# Patient Record
Sex: MALE | Race: WHITE
[De-identification: names, ages, dates, MRNs, and addresses within clinical notes are randomized per-mention and may not be internally consistent; named-entity substitution may affect disease eponyms.]

---

## 2018-01-24 ENCOUNTER — HOSPITAL ENCOUNTER (EMERGENCY)
Dept: HOSPITAL 68 - ERH | Age: 63
Discharge: ANOTHER HOSPITAL | End: 2018-01-24
Payer: COMMERCIAL

## 2018-01-24 VITALS — WEIGHT: 250 LBS | HEIGHT: 73 IN | BODY MASS INDEX: 33.13 KG/M2

## 2018-01-24 VITALS — DIASTOLIC BLOOD PRESSURE: 84 MMHG | SYSTOLIC BLOOD PRESSURE: 145 MMHG

## 2018-01-24 DIAGNOSIS — R22.1: Primary | ICD-10-CM

## 2018-01-24 LAB
ABSOLUTE GRANULOCYTE CT: 7.8 /CUMM (ref 1.4–6.5)
BASOPHILS # BLD: 0 /CUMM (ref 0–0.2)
BASOPHILS NFR BLD: 0.2 % (ref 0–2)
EOSINOPHIL # BLD: 0.1 /CUMM (ref 0–0.7)
EOSINOPHIL NFR BLD: 0.6 % (ref 0–5)
ERYTHROCYTE [DISTWIDTH] IN BLOOD BY AUTOMATED COUNT: 12.8 % (ref 11.5–14.5)
GRANULOCYTES NFR BLD: 77.6 % (ref 42.2–75.2)
HCT VFR BLD CALC: 44.8 % (ref 42–52)
LYMPHOCYTES # BLD: 1.4 /CUMM (ref 1.2–3.4)
MCH RBC QN AUTO: 31.6 PG (ref 27–31)
MCHC RBC AUTO-ENTMCNC: 34.8 G/DL (ref 33–37)
MCV RBC AUTO: 90.8 FL (ref 80–94)
MONOCYTES # BLD: 0.7 /CUMM (ref 0.1–0.6)
PLATELET # BLD: 177 /CUMM (ref 130–400)
PMV BLD AUTO: 8.1 FL (ref 7.4–10.4)
RED BLOOD CELL CT: 4.93 /CUMM (ref 4.7–6.1)
WBC # BLD AUTO: 10.1 /CUMM (ref 4.8–10.8)

## 2018-01-24 NOTE — ED GENERAL ADULT
History of Present Illness
 
General
Chief Complaint: General Adult
Stated Complaint: R SIDE FACIAL SWELLING
Source: patient
Exam Limitations: no limitations
 
Vital Signs & Intake/Output
Vital Signs & Intake/Output
 Vital Signs
 
 
Date Time Temp Pulse Resp B/P B/P Pulse O2 O2 Flow FiO2
 
     Mean Ox Delivery Rate 
 
 1442 97.5 78 18 145/84  99 Room Air  
 
 1215    146/76     
 
 1158 97.0 78 16   98 Room Air  
 
 
 
Allergies
Coded Allergies:
No Known Allergies (18)
 
Triage Note:
62M HAD DENTAL PROCEDURE YESTERDAY WITH CAVITY
 FILLING, PT BLEW HIS NOSE AND FELT THE ENTIRE
 CHEEK FILL UP. CALLED DENTIST WHO REFERRED HIM TO
 ED FOR EVALUATION. PAIN 9/10 HAS NOT TAKEN
 ANYTHING FOR PAIN. AFEBRILE
 INVOLVEMENT, MANAGES OWN SECRETIONS
Triage Nurses Notes Reviewed? yes
Onset: Abrupt
Duration: day(s): (1)
Timing: no prior history
Injury Environment: home
Severity: moderate
Severity Numbers: 7
No Modifying Factors: none
HPI:
Patient is a 62-year-old male with history of melanoma, has been in remission 
for the past 20 years presenting to the emergency department with chief 
complaint of right-sided neck and cheek swelling that started suddenly yesterday
after he had a cavity filled.  Patient was that he went in to get a cavity 
filled was doing great, reports that the workup was extensive and took a little 
bit longer than it usually does but was feeling fine.  Went home and blew his 
nose and his right cheek blew up.  Patient (symptoms are worse today than they 
were yesterday.  He saw his dentist and he advised the patient to complete
(Juany Fernandez)
 
Past History
 
Travel History
Traveled to [place] past 21 day No
 
Medical History
Any Pertinent Medical History? see below for history
Cancer(s): melanoma
 
Surgical History
Surgical History: non-contributory
 
Psychosocial History
What is your primary language English
Tobacco Use: Never used
 
Family History
Hx Contributory? No
(Juany Fernandez)
 
Review of Systems
 
Review of Systems
Constitutional:
Reports: no symptoms. 
Comments
Review of systems: See HPI, All other systems negative.
Constitutional, no chills fever or weight loss
HEENT: No visual changes no sore throat no congestion
Cardiovascular: No chest pain ,palpitation , orthopnea or ankle swelling
Skin, no jaundice no rashes
Respiratory: No dyspnea cough sputum or hemoptysis
GI: No nausea no vomiting
: No dysuria No hematuria
Muscle skeletal: no back pain, no neck pain,
Neurologic: No numbness no confusion
Psych: No stress anxiety or depression,.
Heme/endocrine: No bruising no bleeding no polyuria or polydipsia
Immunology: No splenectomy or history of AIDS
(Juany Fernandez)
 
Physical Exam
 
Physical Exam
General Appearance: well developed/nourished, no apparent distress, alert, awake
, comfortable
Comments:
Well-developed well-nourished person in no acute distress
HEENT:  extraocular motion intact, no nystagmus. Pupils equally round and 
reactive to light and accommodation. Nose is atraumatic. External auditory canal
and Tympanic membranes clear. Pharynx normal. No swelling or edema.  Moderate 
edema noted of the right maxillary sinus that extends into the preauricular 
area.  No hemotympanum.  No crepitus palpated over this area although patient 
does report that he can hear "[employer]" with palpation.  Mild erythema 
noted around the gumline of the right posterior molars, inferior aspect.
Neck: Moderate edema appreciated of the right lateral neck extending and to the 
preauricular area, edema also extends into the maxillary space of the right 
maxillary sinus.  Mildly tender to palpation.
Cardiovascular: Regular rate and rhythms no murmurs rubs or gallops, normal JVP
Respiratory: Chest nontender. No respiratory distress.breath sounds clear to 
auscultation bilaterally
Extremity: No edema
Neuro: Alert oriented x3, motor sensory normal, cranial nerves II through XII 
grossly intact.
Skin: No appreciable rash on exposed skin, skin is warm and dry.
Psych: Mood and affect is normal, memory and judgment is normal.
 
Core Measures
ACS in differential dx? No
CVA/TIA Diagnosis: No
Sepsis Present: No
Sepsis Focused Exam Completed? No
(Juany Fernandez)
 
Progress
Differential Diagnoses
I considered the following diagnoses in my evaluation of the patient:  Abscess, 
periodontal abscess, necrotizing fasciitis, blown sinus, sinusitis
 
Plan of Care:
 Orders
 
 
Procedure Date/time Status
 
BLOOD CULTURE  1408 Active
 
COMPREHENSIVE METABOLIC PANEL  1249 Complete
 
CBC WITHOUT DIFFERENTIAL  1249 Complete
 
 
 Laboratory Tests
 
 
 
18 1300:
Anion Gap 13, Estimated GFR > 60, BUN/Creatinine Ratio 24.0, Glucose 96, Calcium
9.7, Total Bilirubin 0.7, AST 19, ALT 33, Alkaline Phosphatase 72, Total Protein
7.1, Albumin 4.7, Globulin 2.4, Albumin/Globulin Ratio 2.0, CBC w Diff NO MAN 
DIFF REQ, RBC 4.93, MCV 90.8, MCH 31.6  H, MCHC 34.8, RDW 12.8, MPV 8.1, Gran % 
77.6  H, Lymphocytes % 14.2  L, Monocytes % 7.4, Eosinophils % 0.6, Basophils % 
0.2, Absolute Granulocytes 7.8  H, Absolute Lymphocytes 1.4, Absolute Monocytes 
0.7  H, Absolute Eosinophils 0.1, Absolute Basophils 0
 Microbiology
 1445  BLOOD: Blood Culture - RECD
 1438  BLOOD: Blood Culture - RECD
 
 
Diagnostic Imaging:
Viewed by Me: CT Scan.  Discussed w/RAD: CT Scan. 
Radiology Impression: PATIENT: CHERIE BARAJAS  MEDICAL RECORD NO: [id number] PRESENT
AGE: 62  PATIENT ACCOUNT NO: [id number] : 53  LOCATION: [facility] ORDERING 
PHYSICIAN: uJany DONNELLY     SERVICE DATE:  EXAM TYPE: CAT - 
CT NECK W IV CONTRAST EXAMINATION: CT NECK WITH CONTRAST CLINICAL INFORMATION: 
Swelling status post dental work, rule out mass/abscess. COMPARISON: There are 
no prior studies for comparison. TECHNIQUE: Axial images through the neck from 
the facial recess into the supraclavicular region following administration of IV
contrast, 95 mL of Optiray 320. DLP: 611.06 mGy-cm FINDINGS: Gas is present 
within the soft tissues in the right neck region dissecting from the 
subcutaneous soft tissues of the lateral aspect of the mandible deep into the 
lateral pharyngeal space. The gas is present on both external and internal 
margins of the right mandible body and proximal portion of the ramus, and 
extends posteriorly into the margin of the right parotid gland. The gas is also 
tracking into the upper facial region medially adjacent to the right lateral 
pterygoid plate and superior to the level of the lateral orbital wall.. 
Inferiorly the gas is dissecting medially along the anterior margin of the 
sternocleidomastoid to the level of the thoracic inlet. No loculated or 
drainable fluid collection is noted, with no apparent abscess formation. A a few
small cervical lymph nodes are present which are not pathologically enlarged 
likely mildly reactive lymph nodes less than 1 cm in short axis dimension. 
IMPRESSION: 1.Extensive gaseous dissection throughout the soft tissues of the 
right neck from the level of the lateral right orbital wall to the right 
thoracic inlet. The findings are concerning for infection with gas producing 
bacteria. 2. No drainable loculated fluid collection is identified at this time.
The findings were discussed directly with the referring physician, Dr. Juany Crooks, on 2018 at 14:08. DICTATED BY: Meka Huerta MD  DATE/TIME 
DICTATED:18 :RAD.SOTO  DATE/TIME TRANSCRIBED:1353 CONFIDENTIAL, DO NOT COPY WITHOUT APPROPRIATE AUTHORIZATION.  <
Electronically signed in Other Vendor System>                                   
                                                    SIGNED BY: Meka Huerta MD 18 1412
Initial ED EKG: none
Comments:
Spoke with - we will be transferring patient over to outside facility 
for evaluation of the gaseous distention into the right maxillary sinus and into
the right lateral neck.  Patient is afebrile, nontoxic and no white count.  
Spoke with  's reveals a slightly blown sinus. PT INFORMED OF ALL 
IMAGING RESULTS. PT NON-TOXIC. PT RECEIVE PORPHYLACTIC DOSE OF CLEOCIN. 
(Juany Fernandez)
 
Departure
 
Departure
Time of Disposition: 1446
Disposition: OTHER GENERAL HOSPITAL (ACUTE)
Condition: Stable
Clinical Impression
Primary Impression: Neck swelling
Referrals:
Pat Ac MD (PCP/Family)
 
Departure Forms:
Customer Survey
General Discharge Information
(Juany Fernandez)
 
PA/NP Co-Sign Statement
Statement:
ED Attending supervision documentation-
 
[X] I saw and evaluated the patient. I have also reviewed all the pertinent lab 
results and diagnostic results. I agree with the findings and the plan of care 
as documented in the PA's/NP's documentation. 
 
[X] I have reviewed the ED Record and agree with the PA's/NP's documentation.
 
[] Additions or exceptions (if any) to the PAs/NP's note and plan are 
summarized below:
[]
 
(Diana CANALES,Zulay)
 
Critical Care Note
 
Critical Care Note
Critical Care Time: 30-74 min
(Juany Fernandez)
 
[X] I saw and evaluated the patient. I have also reviewed all the pertinent lab 
results and diagnostic results. I agree with the findings and the plan of care 
as documented in the PA's/NP's documentation. 
 
[X] I have reviewed the ED Record and agree with the PA's/NP's documentation.
 
[] Additions or exceptions (if any) to the PAs/NP's note and plan are 
summarized below:
[]
 
(Diana CANALES,Zulay)
 
Critical Care Note
 
Critical Care Note
Critical Care Time: 30-74 min
(Daksha DONNELLY,Juany)

## 2018-01-24 NOTE — CT SCAN REPORT
EXAMINATION:
CT NECK WITH CONTRAST
 
CLINICAL INFORMATION:
Swelling status post dental work, rule out mass/abscess.
 
COMPARISON:
There are no prior studies for comparison.
 
TECHNIQUE:
Axial images through the neck from the facial recess into the supraclavicular
region following administration of IV contrast, 95 mL of Optiray 320.
 
DLP:
611.06 mGy-cm
 
FINDINGS:
Gas is present within the soft tissues in the right neck region dissecting
from the subcutaneous soft tissues of the lateral aspect of the mandible deep
into the lateral pharyngeal space. The gas is present on both external and
internal margins of the right mandible body and proximal portion of the
ramus, and extends posteriorly into the margin of the right parotid gland.
The gas is also tracking into the upper facial region medially adjacent to
the right lateral pterygoid plate and superior to the level of the lateral
orbital wall.. Inferiorly the gas is dissecting medially along the anterior
margin of the sternocleidomastoid to the level of the thoracic inlet.
 
No loculated or drainable fluid collection is noted, with no apparent abscess
formation. A a few small cervical lymph nodes are present which are not
pathologically enlarged likely mildly reactive lymph nodes less than 1 cm in
short axis dimension.
 
IMPRESSION:
1.Extensive gaseous dissection throughout the soft tissues of the right neck
from the level of the lateral right orbital wall to the right thoracic inlet.
The findings are concerning for infection with gas producing bacteria.
2. No drainable loculated fluid collection is identified at this time.
 
The findings were discussed directly with the referring physician, Dr. Juany Crooks, on 1/24/2018 at 14:08.